# Patient Record
Sex: FEMALE | Race: WHITE
[De-identification: names, ages, dates, MRNs, and addresses within clinical notes are randomized per-mention and may not be internally consistent; named-entity substitution may affect disease eponyms.]

---

## 2019-12-16 ENCOUNTER — HOSPITAL ENCOUNTER (INPATIENT)
Dept: HOSPITAL 36 - GERO | Age: 74
LOS: 7 days | Discharge: HOME | DRG: 885 | End: 2019-12-23
Attending: PSYCHIATRY & NEUROLOGY | Admitting: PSYCHIATRY & NEUROLOGY
Payer: MEDICARE

## 2019-12-16 VITALS — DIASTOLIC BLOOD PRESSURE: 83 MMHG | SYSTOLIC BLOOD PRESSURE: 138 MMHG

## 2019-12-16 DIAGNOSIS — M19.90: ICD-10-CM

## 2019-12-16 DIAGNOSIS — F33.2: Primary | ICD-10-CM

## 2019-12-16 DIAGNOSIS — E78.5: ICD-10-CM

## 2019-12-16 DIAGNOSIS — I10: ICD-10-CM

## 2019-12-16 DIAGNOSIS — M81.0: ICD-10-CM

## 2019-12-16 DIAGNOSIS — E53.8: ICD-10-CM

## 2019-12-16 DIAGNOSIS — F29: ICD-10-CM

## 2019-12-16 DIAGNOSIS — Z83.3: ICD-10-CM

## 2019-12-16 DIAGNOSIS — Z82.49: ICD-10-CM

## 2019-12-16 DIAGNOSIS — F41.9: ICD-10-CM

## 2019-12-16 DIAGNOSIS — E87.6: ICD-10-CM

## 2019-12-16 PROCEDURE — G0410 GRP PSYCH PARTIAL HOSP 45-50: HCPCS

## 2019-12-16 PROCEDURE — Z7610: HCPCS

## 2019-12-16 NOTE — HISTORY & PHYSICAL
ADMIT DATE:  12/16/2019



REQUESTING PHYSICIAN:  Dr. Mccabe.



CHIEF COMPLAINT:  "I feel depressed and hopeless."



HISTORY OF PRESENT ILLNESS:  A 74-year-old  female with history of

hypertension, hyperlipidemia, osteoarthritis, depression, presented to Emergency

Room at [facility] with suicidal ideation.  The patient was evaluated

and medically cleared and subsequently transferred to [facility] for

psychiatric treatment.



PAST MEDICAL HISTORY:  Remarkable for:

1.  Hypertension.

2.  Hyperlipidemia.

3.  Degenerative joint disease.

4.  Osteoporosis.

5.  Anxiety.

6.  Vitamin B12 deficiency.



MEDICATIONS AT HOME:  She is taking B12, Xanax, atorvastatin, Ambien,

carvedilol.



ALLERGIES:  The patient is not allergic to medication.



SOCIAL HISTORY:  The patient lives with the family.  The patient has no history

of smoking cigarette, alcohol, or drug abuse.



FAMILY MEDICAL HISTORY:  Remarkable for diabetes and hypertension.



REVIEW OF SYSTEMS:  The patient denies any headache, blurred vision, double

vision, dysphagia, odynophagia, runny nose, stuffy nose, fever, chills, cough,

chest pain, shortness of breath, palpitation, dizziness, nausea, vomiting,

diarrhea, dysuria, hematuria, hematochezia, melena.  No history of any seizure

or syncopal episode.



PHYSICAL EXAMINATION:

GENERAL:  Alert, awake, oriented, lying in the bed without any acute distress.

VITAL SIGNS:  Temperature 98.6, pulse is 68, respiratory rate 18, blood pressure

134/70.

SKIN:  Warm to touch.

HEENT:  Normocephalic, atraumatic.  Extraocular muscles are intact.  Tongue was

pink and coated.  Oropharynx is noncongested.  Nasal mucosa is congested.  No

postnasal drip noted.

NECK:  Supple, no JVD, no hepatojugular reflex.  No lymphadenopathy, thyromegaly

or carotid bruit.

HEART:  Both heart sounds are regular.  No S3, no S4.  Grade 2/6 systolic murmur

noted.

CHEST AND LUNGS:  Equal in expansion, no expiratory wheezing.

ABDOMEN:  Soft, no guarding, no rigidity.  Liver, spleen palpable.  Liver,

spleen not palpable.  No palpable mass.

EXTREMITIES:  No edema, no cyanosis or clubbing.  Pulses are +2.  No calf

tenderness.

NEUROLOGIC:  Alert, awake and oriented to time, place, person.  2-12 cranial

nerves are intact.  Power in upper or lower extremities 5+.  Sensation to touch

intact.  Babinski both toes are going down.  No cerebral sign.



AVAILABLE DIAGNOSTIC DATA:  White count of 11.8, hemoglobin 14.2, platelet count

of 229, potassium of 3.3, sodium of 135, BUN and creatinine are normal at 23 and

0.9.  T4 is 1.2.  Drug screen was negative.  Troponin is negative.  Liver

functions are normal.  EKG reveals sinus rhythm with occasional PACs, old Q-wave

changes noted.  There are changes representing acute ischemia.



CLINICAL IMPRESSION:

1.  Hypertension.

2.  Hyperlipidemia.

3.  Degenerative joint disease.

4.  Osteoporosis.

5.  Anxiety, depression.

6.  Suicidal ideation.

7.  Hypokalemia.



PLAN:

1.  Psychiatric evaluation, which include depression, anxiety and suicidal

ideation.  Management defer to psychiatrist.

2.  Recheck the potassium.

3.  Resume antihypertensive medicine and statins.  Continue to provide general

nursing care along with fall precautions, nutritional support, and we will

continue to follow this patient during the stay in the hospital.



I sincerely thank you, Dr. Mccabe, for giving me the opportunity to

participate in patient of yours.





DD: 12/16/2019 19:26

DT: 12/16/2019 20:07

JOB# [id number]  [id number]

## 2019-12-17 NOTE — PROGRESS NOTES
DATE:  12/17/2019



SUBJECTIVE:  The patient seen and examined.  The patient is lying in the bed. 

The patient currently denies any chest pain, shortness of breath, palpitations,

dizziness, nausea, vomiting, headache.



PHYSICAL EXAMINATION:

VITAL SIGNS:  Temperature 98.6, pulse 74, respiratory rate 18, blood pressure

130/88.

HEENT:  No facial asymmetry.  Poor dentition noted.

NECK:  Supple.  No JVD, no hepatojugular reflux.  No lymphadenopathy,

thyromegaly or carotid bruit.

HEART:  Both heart sounds are regular.  No S3, no S4.

CHEST AND LUNGS:  Equal in expansion, no expiratory wheezing.

ABDOMEN:  Soft.  Bowel sounds present.  No palpable mass.

EXTREMITIES:  No edema.

NEUROLOGIC:  Nonfocal.



CLINICAL IMPRESSION:

1.  Hypertension.

2.  Hyperlipidemia.

3.  Degenerative joint disease.

4.  Osteoporosis.

5.  Anxiety, depression.

6.  Psychotic disorder.



PLAN:

1.  Monitor blood pressure.

2.  Antihypertensive medicine.

3.  Statin.

4.  Motrin p.r.n. for pain.

5.  Psychotic evaluation and management deferred to psychiatrist.

6.  Fall precaution.

7.  Nutritional support.

8.  Care plan reviewed and discussed with staff.





DD: 12/17/2019 19:04

DT: 12/17/2019 22:14

JOB# [id number]  [id number]

## 2019-12-18 NOTE — PSYCHIATRIC EVALUATION
DATE OF SERVICE:  12/16/2019



PSYCHIATRIC EVALUATION AND EXAMINATION



IDENTIFYING DATA:  The patient is a 74-year-old  woman referred over

from home to [facility] from where she has been assessed and has been

transferred over here for suicidal ideation.  The patient is reporting that she

has been feeling depressed at least for the past couple of months, more severe

than before.  The patient is reported to have been treated for depression at

least for the past 3 years and is reporting ____ by Dr. Marroquin.  The patient is

reported to have been on phenelzine or Nardil for the past 2 weeks.  The patient

has stopped taking the medication 5 days ago.  The patient's son has been

contacted and has also gotten some information.  Sleep and appetite prior to the

hospitalization was reported to be very poor.  The patient is reported to have

been stopped eating and has been very tearful and crying and stating that she

does not feel comfortable with her current living situation and she wants to end

her life.  The patient is very tearful and crying during the interview, the

patient is also stating that she has been having extreme pain in the left side

after the back surgery.  The patient has been noted to be declining for the past

2 weeks and has feelings of helplessness and hopelessness.  The patient's son is

very much concerned and then brought her for evaluation.



PAST PSYCHIATRIC HISTORY:  The patient is reported to be followed up by a

psychiatrist on an outpatient basis ____ is reported.



SOCIAL HISTORY:  The patient is reporting that she was living by herself and

then she went to live with her son for a couple of weeks.



MEDICAL HISTORY:  Physical examination is requested to be done by Dr. Del Castillo.



PAST PSYCHIATRIC HISTORY:  Please refer to the above.



MEDICAL HISTORY AND PHYSICAL EXAMINATION:  Requested to be by Dr. Del Castillo.



SUBSTANCE ABUSE HISTORY:  None.



PHYSICAL OR SEXUAL ABUSE HISTORY:  None.



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

 She had a master's degree and she was also a .



STRENGTH AND ASSETS:  The patient is motivated.



MENTAL STATUS EXAMINATION:  The patient is a 74-year-old woman, thin built,

cooperative.  Eye contact is noted to be poor.  Mood is noted to be depressed. 

Affect is constricted.  The patient is tearful and crying.  The patient is

suicidal, but no clear plans are voiced.  Insight and judgment at this time are

noted to be impaired.  Impulse control is noted to be limited.  The patient's

short term memory is noted to be poor.  Long-term memory seems to be fair.  The

patient has been feeling frustrated with her living situation at this time.  The

patient is a clear danger to self at this time.



DIAGNOSTIC IMPRESSION:

AXIS I:  Major depressive disorder, recurrent and severe.

AXIS II:  None.

AXIS III:  As per Dr. Del Castillo.



IMMEDIATE TREATMENT PLAN:  The patient is going to be observed on inpatient

unit, provided with supportive psychotherapy.  The patient is going to be

closely monitored and encouraged to participate in the groups and verbalize the

concerns.  Once stabilized, the patient is going to be discharged to [facility] to be

followed up on an outpatient basis.





DD: 12/17/2019 17:42

DT: 12/18/2019 00:20

[facility]# [id number]  [id number]

## 2019-12-18 NOTE — INTERNAL MEDICINE PROG NOTE
Internal Medicine Subjective





- Subjective


Patient seen and examined:: with staff, chart reviewed


Patient is:: awake, verbal, interactive


Per staff patient has:: no adverse event, other (co jt pain)





Internal Medicine Objective





- Physical Exam


Vitals and I&O: 


 Vital Signs











Temp  97.3 F   12/17/19 20:33


 


Pulse  81   12/18/19 09:08


 


Resp  18   12/17/19 20:33


 


BP  164/103   12/18/19 09:08


 


Pulse Ox  98   12/17/19 20:33








 Intake & Output











 12/17/19 12/18/19 12/18/19





 18:59 06:59 18:59


 


Intake Total 1000 240 


 


Balance 1000 240 


 


Intake:   


 


  Oral 1000 240 


 


Other:   


 


  # Voids 3 1 


 


  # Bowel Movements 1  











Active Medications: 


Current Medications





Acetaminophen (Tylenol)  650 mg PO Q4H PRN


   PRN Reason: Mild Pain (Scale 1-3)


   Stop: 02/14/20 13:52


   Last Admin: 12/18/19 00:30 Dose:  650 mg


Acetaminophen (Tylenol)  650 mg PO Q4H PRN


   PRN Reason: headache


   Stop: 02/14/20 15:56


Acetaminophen (Tylenol)  650 mg PO Q4HR PRN


   PRN Reason: TEMP ABOVE 101


   Stop: 02/14/20 16:06


Alprazolam (Xanax)  0.5 mg PO BID [facility]; Protocol


   Stop: 02/14/20 16:59


   Last Admin: 12/18/19 08:56 Dose:  0.5 mg


Atorvastatin Calcium (Lipitor)  10 mg PO DAILY [facility]; Protocol


   Stop: 02/15/20 08:59


   Last Admin: 12/18/19 09:11 Dose:  10 mg


Carvedilol (Coreg)  12.5 mg PO BID [facility]


   Stop: 02/15/20 08:59


   Last Admin: 12/18/19 09:08 Dose:  12.5 mg


Ibuprofen (Motrin)  400 mg PO Q8H PRN


   PRN Reason: pain


   Stop: 02/14/20 19:50


   Last Admin: 12/18/19 06:43 Dose:  400 mg


Magnesium Hydroxide (Milk Of Magnesia)  30 ml PO HS PRN


   PRN Reason: Constipation


   Stop: 02/14/20 13:52


Zolpidem Tartrate (Ambien)  5 mg PO HS PRN


   PRN Reason: Insomnia


   Stop: 02/14/20 14:10


   Last Admin: 12/17/19 22:22 Dose:  5 mg








General: alert


HEENT: NC/AT, PERRLA, EOMI


Neck: Supple, No JVD


Lungs: CTAB


Cardiovascular: RRR, Normal S1, Normal S2


Abdomen: soft, non-tender, non-distended


Extremities: excoriation





Internal Medicine Assmt/Plan





- Assessment


Assessment: 





htn


hyperlipidemia


djd


oa


osteoporosis


hypokalemia





- Plan


Plan: 





will adjust pain medication


will increase motrin 


fall precaution 


cpm 


esa rn

## 2019-12-19 NOTE — CONSULTATION
DATE OF CONSULTATION:     12/18/2019



REFERRING PHYSICIAN:  Elodia Mccabe M.D.



TYPE OF CONSULTATION:  Psychology.



HISTORY OF PRESENT ILLNESS:  The patient is seen and evaluated.  Case has been

discussed with staff.  The patient is a 74-year-old  female.  The

patient was referred from home to [facility] due to verbalizing

suicidal ideation.  The following is by record review and by patient self 
report.

The patient at the time of this interview reports that she has been

depressed and has a history of depression.  The patient is stating that

this is becoming to be more of a problem and her depression is worsening.  The

patient states that she has been under the care of a psychiatrist in the past. 

The patient also stated that she had stopped taking her medication prior to the

patient's admission.  The patient states that she had become frustrated with her

current living circumstances and wanted to end her life.  Staff reports the

patient has had multiple tearful and crying episodes.  She is complaining of

pain in her left side.  The patient endorsed feelings of helplessness and

hopelessness.  She admits a passive suicidal ideation at the time of this

clinical interview.



PAST MEDICAL HISTORY:  Please see history and physical by Dr. Del Castillo.



PAST PSYCHIATRIC HISTORY:  The patient has been under the care of a psychiatrist

on an outpatient basis.  The patient did not provide the psychiatrist's name. 

The patient stated no psychology services or any psychotherapy recently. 

The patient has a long history of major depression.



SUBSTANCE ABUSE HISTORY:  The patient denied any history of alcohol, tobacco or

illicit drug use.



PSYCHOSOCIAL HISTORY:  The patient states that her son is very involved in her

care.  The patient states her son's name is Negro and has visited her here in

the hospital and will be involved in her discharge.  The patient states that she

is Confucianist and .  She stated that she has a master's degree and 
that

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

licensed.  As such, the patient also stated she is a retired . 

The patient lives by herself and had been living with her son briefly for the

last 2 or more weeks.  She plans to return home.



MENTAL STATUS EXAMINATION:  The patient appears to be frail and thinly built. 

Eye contact is poor.  Speech is slow, but responsive.  Attitude is cooperative. 

Mood is depressed.  Affect is labile with tearful or crying episodes in the

past.  The patient admits having suicidal ideation; however, she did not state

any particular plan or means.  She endorsed the feeling of hopelessness.  

The patient denied any auditory or visual hallucinations.  The patient's 
behavior has

been proactive with her care here.  Impulse control is limited.  The patient 
was 

unable to repeat 2 of 3 items given to her after several minutes.  Short-term 
memory 

appears to be poor.  Long-term memory seems to be adequate for her age.  The 
patient 

continued to state frustration with her living circumstances.  The patient did 
not participate 

in the interpretation of proverbs.  Insight is poor.  Judgment is compromised.



DIAGNOSTIC IMPRESSION

AXIS I:  Major depressive disorder, recurrent, severe.

AXIS II:  None.

AXIS III:  Per Dr. Del Castillo.



TREATMENT PLAN:  The patient has been seen by Dr. Mccabe for psychiatric

evaluation and for the management of the patient's psychotropic medications.  We

will provide supportive psychotherapy to include suicidal assessment as well as

suicide prevention interventions.  We will encourage the patient to verbalize

her concerns or any suicidal thoughts to staff.  We will encourage the patient

to ventilate her feelings of hopelessness and helplessness and participate in

the psychotherapeutic interventions versus taking any self-harm actions.  We

provided the patient the opportunity to verbally contract for no self-harm and 
for

safety.  The patient was able to do this on the unit.  We recommend follow up

with a psychologist post-discharge.  We will provide cognitive behavioral

therapy to reduce the patient's depression and encourage her to stay compliant

with her medication as she has admitted that she abruptly ceased taking her

medications recently, which may have precipitated this passive suicidal thought 

and her hospitalization.



Thank you, Dr. Mccabe for this consult and the opportunity to participate

with you in this patient's care.





DD: 12/19/2019 10:23

DT: 12/19/2019 11:18

JOB# [id number]  [id number]

ROSENDO

## 2019-12-19 NOTE — INTERNAL MEDICINE PROG NOTE
Internal Medicine Subjective





- Subjective


Patient seen and examined:: with staff, chart reviewed


Patient is:: awake, verbal, interactive


Per staff patient has:: no adverse event, other (co jt pain)





Internal Medicine Objective





- Physical Exam


Vitals and I&O: 


 Vital Signs











Temp  97.6 F   12/19/19 07:30


 


Pulse  71   12/19/19 09:55


 


Resp  20   12/19/19 07:30


 


BP  171/99   12/19/19 09:55


 


Pulse Ox  100   12/19/19 07:30








 Intake & Output











 12/18/19 12/19/19 12/19/19





 18:59 06:59 18:59


 


Intake Total 1000 120 120


 


Balance 1000 120 120


 


Intake:   


 


  Oral 760 120 120


 


  Other 240  


 


Other:   


 


  # Voids 3 2 3


 


  # Bowel Movements 1 0 0


 


  Stool Characteristics   Formed





   Brown











Active Medications: 


Current Medications





Acetaminophen (Tylenol)  650 mg PO Q4H PRN


   PRN Reason: Mild Pain (Scale 1-3)


   Stop: 02/14/20 13:52


   Last Admin: 12/18/19 00:30 Dose:  650 mg


Acetaminophen (Tylenol)  650 mg PO Q4H PRN


   PRN Reason: headache


   Stop: 02/14/20 15:56


Acetaminophen (Tylenol)  650 mg PO Q4HR PRN


   PRN Reason: TEMP ABOVE 101


   Stop: 02/14/20 16:06


Alprazolam (Xanax)  0.5 mg PO BID [facility]; Protocol


   Stop: 02/14/20 16:59


   Last Admin: 12/19/19 09:55 Dose:  0.5 mg


Atorvastatin Calcium (Lipitor)  10 mg PO DAILY [facility]; Protocol


   Stop: 02/15/20 08:59


   Last Admin: 12/19/19 09:54 Dose:  10 mg


Carvedilol (Coreg)  12.5 mg PO BID [facility]


   Stop: 02/15/20 08:59


   Last Admin: 12/19/19 09:55 Dose:  12.5 mg


Ibuprofen (Motrin)  600 mg PO Q8H PRN


   PRN Reason: Moderate pain (Scale 4-6)


   Stop: 02/14/20 19:50


   Last Admin: 12/19/19 05:42 Dose:  600 mg


Magnesium Hydroxide (Milk Of Magnesia)  30 ml PO HS PRN


   PRN Reason: Constipation


   Stop: 02/14/20 13:52


Zolpidem Tartrate (Ambien)  5 mg PO HS PRN


   PRN Reason: Insomnia


   Stop: 02/14/20 14:10


   Last Admin: 12/18/19 20:44 Dose:  5 mg








General: alert


HEENT: NC/AT, PERRLA, EOMI


Neck: Supple, No JVD


Lungs: CTAB


Cardiovascular: RRR, Normal S1, Normal S2


Abdomen: soft, non-tender, non-distended


Extremities: excoriation





Internal Medicine Assmt/Plan





- Assessment


Assessment: 





htn


hyperlipidemia


djd


oa


osteoporosis


hypokalemia





- Plan


Plan: 





will adjust pain medication


will increase motrin 


fall precaution 


cpm 


dw rn

## 2019-12-19 NOTE — PROGRESS NOTES
DATE:  12/18/2019



SUBJECTIVE:  Staff was spoken to.  The patient is interviewed.  Mood is noted to

be depressed.  Affect is constricted.  Insight and judgment are noted to be

still impaired.  Impulse control is noted to be limited.  The patient is still

insisting on ending her life.  Coping skills at this time are noted to be poor. 

No side effects to the medications are noted.



ASSESSMENT:  The patient is still depressed.



PLAN:  To continue the patient with the supportive therapy, encouraged the

patient to verbalize the concerns rather than to act out.  The patient is going

to be started on the Fenalgin.





DD: 12/18/2019 18:28

DT: 12/19/2019 01:47

JOB# [id number]  [id number]

## 2019-12-19 NOTE — PROGRESS NOTES
DATE:  12/19/2019



PSYCHIATRIC PROGRESS NOTE



SUBJECTIVE:  Staff was spoken to.  The patient is interviewed.  Mood is noted to

be irritable.  Affect is constricted.  Insight and judgment at this time are

noted to be still impaired.  Impulse control is noted to be limited.  The

patient's son does not want the patient to be started on any medications. 

Coping skills at this time are noted to be still poor.  The patient is still

depressed.  The patient's son thinks that it is the phenelzine that made her to

be confused and he wants to be off of any medications.  Since phenelzine happens

to be the monoamine oxidase inhibitor, I cannot start any antidepressant

medication at this time.  The patient is going to be closely monitored.  I

encouraged to verbalize the concerns rather than to act out at this time.





DD: 12/19/2019 10:54

DT: 12/19/2019 19:31

JOB# [id number]  [id number]

## 2019-12-20 NOTE — INTERNAL MEDICINE PROG NOTE
Internal Medicine Subjective





- Subjective


Patient seen and examined:: with staff, chart reviewed


Patient is:: awake, verbal, interactive


Per staff patient has:: no adverse event, other (co jt pain)





Internal Medicine Objective





- Physical Exam


Vitals and I&O: 


 Vital Signs











Temp  97.7 F   12/20/19 14:06


 


Pulse  75   12/20/19 14:06


 


Resp  18   12/20/19 14:06


 


BP  147/95   12/20/19 14:06


 


Pulse Ox  100   12/20/19 14:06








 Intake & Output











 12/19/19 12/20/19 12/20/19





 18:59 06:59 18:59


 


Intake Total 1320 120 


 


Balance 1320 120 


 


Intake:   


 


  Oral 1200 120 


 


  Other 120  


 


Other:   


 


  # Voids 3 2 


 


  # Bowel Movements 1  


 


  Stool Characteristics Formed  Formed





 Brown  Brown











Active Medications: 


Current Medications





Acetaminophen (Tylenol)  650 mg PO Q4H PRN


   PRN Reason: Mild Pain (Scale 1-3)


   Stop: 02/14/20 13:52


   Last Admin: 12/18/19 00:30 Dose:  650 mg


Acetaminophen (Tylenol)  650 mg PO Q4H PRN


   PRN Reason: headache


   Stop: 02/14/20 15:56


Acetaminophen (Tylenol)  650 mg PO Q4HR PRN


   PRN Reason: TEMP ABOVE 101


   Stop: 02/14/20 16:06


Alprazolam (Xanax)  0.5 mg PO BID [facility]; Protocol


   Stop: 02/14/20 16:59


   Last Admin: 12/20/19 08:12 Dose:  0.5 mg


Atorvastatin Calcium (Lipitor)  10 mg PO DAILY [facility]; Protocol


   Stop: 02/15/20 08:59


   Last Admin: 12/20/19 08:11 Dose:  10 mg


Carvedilol (Coreg)  12.5 mg PO BID [facility]


   Stop: 02/15/20 08:59


   Last Admin: 12/20/19 09:02 Dose:  12.5 mg


Ibuprofen (Motrin)  600 mg PO Q8H PRN


   PRN Reason: Moderate pain (Scale 4-6)


   Stop: 02/14/20 19:50


   Last Admin: 12/20/19 08:11 Dose:  600 mg


Magnesium Hydroxide (Milk Of Magnesia)  30 ml PO HS PRN


   PRN Reason: Constipation


   Stop: 02/14/20 13:52


Zolpidem Tartrate (Ambien)  5 mg PO HS PRN


   PRN Reason: Insomnia


   Stop: 02/14/20 14:10


   Last Admin: 12/19/19 21:28 Dose:  5 mg








General: alert


HEENT: NC/AT, PERRLA, EOMI


Neck: Supple, No JVD


Lungs: CTAB


Cardiovascular: RRR, Normal S1, Normal S2


Abdomen: soft, non-tender, non-distended


Extremities: excoriation





Internal Medicine Assmt/Plan





- Assessment


Assessment: 





htn


hyperlipidemia


djd


oa


osteoporosis


hypokalemia





- Plan


Plan: 





will adjust pain medication


will increase motrin 


fall precaution 


cpm 


dw rn

## 2019-12-21 NOTE — PROGRESS NOTES
DATE:  12/21/2019



PSYCHIATRIC PROGRESS NOTE



SUBJECTIVE:  Staff was spoken to.  The patient is interviewed.  Mood is noted to

be anxious.  The patient's insight and judgment are noted to be improving. 

Impulse control seems to be fair.  No side effects to the medications are noted.

 The patient has been able to verbalize the concerns rather than to act out. 

The patient's coping skills at this time are noted to be improving.  The patient

is stating that she would rather not take any psychotropic medications and would

like to go for counseling rather than taking the medications at this time.



ASSESSMENT:  The patient is going to be closely monitored.  I encouraged to

verbalize the concerns rather than to act out at this time.



PLAN:  To monitor the patient and followup.





DD: 12/21/2019 09:10

DT: 12/21/2019 21:19

[facility]# [id number]  [id number]

## 2019-12-21 NOTE — PROGRESS NOTES
DATE:     12/20/2019



PSYCHOLOGY PROGRESS NOTE



SUBJECTIVE:  The patient is seen and is interviewed.  Case is discussed with

staff.  The patient presents as irritable as well as dismissive and somewhat

resistant.  Affect is constricted.  The patient's impulse control is limited. 

Insight is still somewhat impaired.  The patient did admit that she is

depressed.  The family has spoken with the attending psychiatrist and with the

staff regarding medications.  The patient's son is involved in her care and is

concerned about the type of medication.



ASSESSMENT:  The patient continues to be agitated as well as guarded and

suspicious at times.  The patient continues to be depressed.



PLAN:  The patient's son is involved in the patient's care and has been

discussing with the attending psychiatrist about medication changes.  This is an

ongoing consultation with family and the attending/admitting psychiatrist.  We

will provide coping strategies for phase of life issues.  We will provide

de-escalation and motivation al enhancement for the patient to become compliant

and to become less agitated as well as follow through with staff direction.  We 

will provide cognitive behavioral therapy if the patient is able to demonstrate 
capacity 

to participate as well as benefit from this type of psychotherapeutic 
intervention,

which is designed to reduce the patient's depression.  We will provide conflict 

resolution as well as problem solving and solution focused therapy as well.  
This writer 

will follow up in 2-3 days to continue the present treatment if the patient 
remains

admitted on the unit.





DD: 12/21/2019 10:50

DT: 12/21/2019 11:12

JOB# [id number]  [id number]

ROSENDO

## 2019-12-21 NOTE — INTERNAL MEDICINE PROG NOTE
Internal Medicine Subjective





- Subjective


Service Date: 19


Patient is:: awake, verbal, interactive


Per staff patient has:: no adverse event, other (co jt pain)





Internal Medicine Objective





- Physical Exam


Vitals and I&O: 


 Vital Signs











Temp  97.1 F   19 14:34


 


Pulse  72   19 14:34


 


Resp  20   19 14:34


 


BP  162/90   19 14:34


 


Pulse Ox  99   19 14:34








 Intake & Output











 19





 18:59 06:59 18:59


 


Intake Total  220 


 


Balance  220 


 


Intake:   


 


  Oral  220 


 


Other:   


 


  # Voids  1 


 


  # Bowel Movements  1 


 


  Stool Characteristics Formed  Formed





 Brown  Brown











Active Medications: 


Current Medications





Acetaminophen (Tylenol)  650 mg PO Q4H PRN


   PRN Reason: Mild Pain (Scale 1-3)


   Stop: 20 13:52


   Last Admin: 19 00:30 Dose:  650 mg


Acetaminophen (Tylenol)  650 mg PO Q4H PRN


   PRN Reason: headache


   Stop: 20 15:56


Acetaminophen (Tylenol)  650 mg PO Q4HR PRN


   PRN Reason: TEMP ABOVE 101


   Stop: 20 16:06


Alprazolam (Xanax)  0.5 mg PO BID [facility]; Protocol


   Stop: 20 16:59


   Last Admin: 19 08:48 Dose:  0.5 mg


Atorvastatin Calcium (Lipitor)  10 mg PO DAILY [facility]; Protocol


   Stop: 02/15/20 08:59


   Last Admin: 19 08:48 Dose:  10 mg


Carvedilol (Coreg)  12.5 mg PO BID [facility]


   Stop: 02/15/20 08:59


   Last Admin: 19 08:48 Dose:  12.5 mg


Ibuprofen (Motrin)  600 mg PO Q8H PRN


   PRN Reason: Moderate pain (Scale 4-6)


   Stop: 20 19:50


   Last Admin: 19 09:06 Dose:  600 mg


Magnesium Hydroxide (Milk Of Magnesia)  30 ml PO HS PRN


   PRN Reason: Constipation


   Stop: 20 13:52


Zolpidem Tartrate (Ambien)  5 mg PO HS PRN


   PRN Reason: Insomnia


   Stop: 20 14:10


   Last Admin: 19 21:11 Dose:  5 mg








General: alert


HEENT: NC/AT, PERRLA, EOMI


Neck: Supple, No JVD


Lungs: CTAB


Cardiovascular: RRR, Normal S1, Normal S2


Abdomen: soft, non-tender, non-distended


Extremities: excoriation





Internal Medicine Assmt/Plan





- Assessment


Assessment: 





htn


hyperlipidemia


djd


oa


osteoporosis














- Plan


Plan: 





will adjust pain medication


will increase motrin 


fall precaution 


cpm 


dw rn 








Nutritional Asmnt/Malnutr-PDOC





- Dietary Evaluation


Malnutrition Findings (Please click <Entered> for more info): 








Nutritional Asmnt/Malnutrition                             Start:  19 12:

45


Text:                                                      Status: Complete    

  


Freq:                                                                          

  


Protocol:                                                                      

  


 Document     19 12:45  MMULWELLINGTON  (Rec: 19 12:53  MMULWELLINGTON DU-

FNS1)


 Nutritional Asmnt/Malnutrition


     Patient General Information


      Nutritional Screening                      Low Risk


      Diagnosis                                  Psychosis


      Pertinent Medical Hx/Surgical Hx           Hypertension, hyperlipidemia,


                                                 osteoarthritis, depression


      Subjective Information                     Patient was admitted to ER


                                                 with suicidal ideation.


                                                 Patient in bed reading at time


                                                 of visit. Tolerating current


                                                 diet order with adequate


                                                 intake. Will honor patients


                                                 food preferences. Patient did


                                                 not appear to be 6 ft 6 in


                                                 tall as stated in EMR; likely


                                                 66 in (5ft 6in).


      Current Diet Order/ Nutrition Support      Regular


      Patient / S.O                              Not Indicated


      Pertinent Medications                      Lipitor, MOM


      Pertinent Labs                             Nutrition related labs WNL


     Nutritional Hx/Data


      Height                                     5 ft 6 in


      Height (Calculated Centimeters)            167.6


      Current Weight (lbs)                       146 lb


      Weight (Calculated Kilograms)              66.2


      Weight (Calculated Grams)                  15809.5


      Body Mass Index (BMI)                      23.6


      Recent Weight Change                       No


      Weight Status                              Approriate


     GI Symptoms


      GI Symptoms                                None


      Last BM                                    12/21 x 1


      Difficult in:                              None


      Food Allergies                             Yes: Milk


      Cultural/Ethnic/Taoism Belief           none indicated


      Usual diet at home                         regular


      Skin Integrity/Comment:                    Brown 21, Intact


      Current %PO                                Good (%)


     Estimated Nutritional Goals


      BEE in Kcals:                              Using Current wt


      Calories/Kcals/Kg                          66.3kg CBW 25-30 kcal/kg


      Kcals Calculated                           ~3724-3694 kcal/day


      Protein:                                   Using Current wt


      Protein g/k-1.2 gm/kg


      Protein Calculated                         ~65-80 gm/day


      Fluid: ml                                  ~1840-7094 ml/day (1 ml/kcal)


     Nutritional Problem


      No current Nutrition Prob


       Problem                                   No nutrition diagnosis at this


                                                 time


     Intervention/Recommendation


      Comments                                   1. Continue regular diet as


                                                 tolerated by patient.


     Expected Outcomes/Goals


      Expected Outcomes/Goals                    Oral intake >75% of meals,


                                                 weight stable, nutrition


                                                 related labs WNL


                                                 F/U LR 7-10 days -

## 2019-12-22 NOTE — INTERNAL MEDICINE PROG NOTE
Internal Medicine Subjective





- Subjective


Service Date: 19


Patient is:: awake, verbal, interactive


Per staff patient has:: no adverse event, other (co jt pain)





Internal Medicine Objective





- Physical Exam


Vitals and I&O: 


 Vital Signs











Temp  98.0 F   19 06:22


 


Pulse  95   19 08:21


 


Resp  18   19 06:22


 


BP  147/99   19 08:21


 


Pulse Ox  96   19 06:22








 Intake & Output











 19





 18:59 06:59 18:59


 


Intake Total 1000 240 


 


Balance 1000 240 


 


Intake:   


 


  Oral 1000 240 


 


Other:   


 


  # Voids 4 1 


 


  # Bowel Movements 1  


 


  Stool Characteristics Formed  





 Brown  











Active Medications: 


Current Medications





Acetaminophen (Tylenol)  650 mg PO Q4H PRN


   PRN Reason: Mild Pain (Scale 1-3)


   Stop: 20 13:52


   Last Admin: 19 00:30 Dose:  650 mg


Acetaminophen (Tylenol)  650 mg PO Q4H PRN


   PRN Reason: headache


   Stop: 20 15:56


Acetaminophen (Tylenol)  650 mg PO Q4HR PRN


   PRN Reason: TEMP ABOVE 101


   Stop: 20 16:06


Alprazolam (Xanax)  0.5 mg PO BID [facility]; Protocol


   Stop: 20 16:59


   Last Admin: 19 08:21 Dose:  0.5 mg


Atorvastatin Calcium (Lipitor)  10 mg PO DAILY [facility]; Protocol


   Stop: 02/15/20 08:59


   Last Admin: 19 08:21 Dose:  10 mg


Carvedilol (Coreg)  12.5 mg PO BID [facility]


   Stop: 02/15/20 08:59


   Last Admin: 19 08:21 Dose:  12.5 mg


Ibuprofen (Motrin)  600 mg PO Q8H PRN


   PRN Reason: Moderate pain (Scale 4-6)


   Stop: 20 19:50


   Last Admin: 19 08:34 Dose:  600 mg


Magnesium Hydroxide (Milk Of Magnesia)  30 ml PO HS PRN


   PRN Reason: Constipation


   Stop: 20 13:52


Zolpidem Tartrate (Ambien)  5 mg PO HS PRN


   PRN Reason: Insomnia


   Stop: 20 14:10


   Last Admin: 19 21:05 Dose:  5 mg








General: alert


HEENT: NC/AT, PERRLA, EOMI


Neck: Supple, No JVD


Lungs: CTAB


Cardiovascular: RRR, Normal S1, Normal S2


Abdomen: soft, non-tender, non-distended


Extremities: excoriation





Internal Medicine Assmt/Plan





- Assessment


Assessment: 





htn


hyperlipidemia


djd


oa


osteoporosis














- Plan


Plan: 





will adjust pain medication


will increase motrin 


fall precaution 


cpm 


dw rn 








Nutritional Asmnt/Malnutr-PDOC





- Dietary Evaluation


Malnutrition Findings (Please click <Entered> for more info): 








Nutritional Asmnt/Malnutrition                             Start:  19 12:

45


Text:                                                      Status: Complete    

  


Freq:                                                                          

  


Protocol:                                                                      

  


 Document     19 12:45  MMULHERN  (Rec: 19 12:53  MMULHERN  FLORIAN-

FNS1)


 Nutritional Asmnt/Malnutrition


     Patient General Information


      Nutritional Screening                      Low Risk


      Diagnosis                                  Psychosis


      Pertinent Medical Hx/Surgical Hx           Hypertension, hyperlipidemia,


                                                 osteoarthritis, depression


      Subjective Information                     Patient was admitted to ER


                                                 with suicidal ideation.


                                                 Patient in bed reading at time


                                                 of visit. Tolerating current


                                                 diet order with adequate


                                                 intake. Will honor patients


                                                 food preferences. Patient did


                                                 not appear to be 6 ft 6 in


                                                 tall as stated in EMR; likely


                                                 66 in (5ft 6in).


      Current Diet Order/ Nutrition Support      Regular


      Patient / S.O                              Not Indicated


      Pertinent Medications                      Lipitor, MOM


      Pertinent Labs                             Nutrition related labs WNL


     Nutritional Hx/Data


      Height                                     5 ft 6 in


      Height (Calculated Centimeters)            167.6


      Current Weight (lbs)                       146 lb


      Weight (Calculated Kilograms)              66.2


      Weight (Calculated Grams)                  53247.5


      Body Mass Index (BMI)                      23.6


      Recent Weight Change                       No


      Weight Status                              Approriate


     GI Symptoms


      GI Symptoms                                None


      Last BM                                    12/21 x 1


      Difficult in:                              None


      Food Allergies                             Yes: Milk


      Cultural/Ethnic/Moravian Belief           none indicated


      Usual diet at home                         regular


      Skin Integrity/Comment:                    Brown 21, Intact


      Current %PO                                Good (%)


     Estimated Nutritional Goals


      BEE in Kcals:                              Using Current wt


      Calories/Kcals/Kg                          66.3kg CBW 25-30 kcal/kg


      Kcals Calculated                           ~4222-0917 kcal/day


      Protein:                                   Using Current wt


      Protein g/k-1.2 gm/kg


      Protein Calculated                         ~65-80 gm/day


      Fluid: ml                                  ~7934-2737 ml/day (1 ml/kcal)


     Nutritional Problem


      No current Nutrition Prob


       Problem                                   No nutrition diagnosis at this


                                                 time


     Intervention/Recommendation


      Comments                                   1. Continue regular diet as


                                                 tolerated by patient.


     Expected Outcomes/Goals


      Expected Outcomes/Goals                    Oral intake >75% of meals,


                                                 weight stable, nutrition


                                                 related labs WNL


                                                 F/U LR 7-10 days -